# Patient Record
Sex: FEMALE | Race: WHITE | NOT HISPANIC OR LATINO
[De-identification: names, ages, dates, MRNs, and addresses within clinical notes are randomized per-mention and may not be internally consistent; named-entity substitution may affect disease eponyms.]

---

## 2021-05-18 ENCOUNTER — APPOINTMENT (OUTPATIENT)
Age: 31
End: 2021-05-18
Payer: COMMERCIAL

## 2021-05-18 PROCEDURE — 0001A: CPT

## 2021-06-08 ENCOUNTER — APPOINTMENT (OUTPATIENT)
Age: 31
End: 2021-06-08
Payer: COMMERCIAL

## 2021-06-08 PROCEDURE — 0002A: CPT

## 2024-04-08 ENCOUNTER — EMERGENCY (EMERGENCY)
Facility: HOSPITAL | Age: 34
LOS: 1 days | Discharge: HOME | End: 2024-04-08
Attending: EMERGENCY MEDICINE | Admitting: EMERGENCY MEDICINE
Payer: COMMERCIAL

## 2024-04-08 VITALS
OXYGEN SATURATION: 98 % | DIASTOLIC BLOOD PRESSURE: 75 MMHG | TEMPERATURE: 98 F | WEIGHT: 123.02 LBS | HEART RATE: 87 BPM | RESPIRATION RATE: 18 BRPM | SYSTOLIC BLOOD PRESSURE: 110 MMHG

## 2024-04-08 VITALS
OXYGEN SATURATION: 97 % | TEMPERATURE: 98 F | DIASTOLIC BLOOD PRESSURE: 59 MMHG | SYSTOLIC BLOOD PRESSURE: 93 MMHG | HEART RATE: 64 BPM | RESPIRATION RATE: 16 BRPM

## 2024-04-08 LAB
ALBUMIN SERPL ELPH-MCNC: 3.2 G/DL — LOW (ref 3.4–5)
ALP SERPL-CCNC: 40 U/L — SIGNIFICANT CHANGE UP (ref 40–120)
ALT FLD-CCNC: 23 U/L — SIGNIFICANT CHANGE UP (ref 12–42)
ANION GAP SERPL CALC-SCNC: 9 MMOL/L — SIGNIFICANT CHANGE UP (ref 9–16)
APPEARANCE UR: CLEAR — SIGNIFICANT CHANGE UP
AST SERPL-CCNC: 20 U/L — SIGNIFICANT CHANGE UP (ref 15–37)
BASOPHILS # BLD AUTO: 0.01 K/UL — SIGNIFICANT CHANGE UP (ref 0–0.2)
BASOPHILS NFR BLD AUTO: 0.2 % — SIGNIFICANT CHANGE UP (ref 0–2)
BILIRUB SERPL-MCNC: 0.6 MG/DL — SIGNIFICANT CHANGE UP (ref 0.2–1.2)
BILIRUB UR-MCNC: NEGATIVE — SIGNIFICANT CHANGE UP
BUN SERPL-MCNC: 9 MG/DL — SIGNIFICANT CHANGE UP (ref 7–23)
CALCIUM SERPL-MCNC: 8.4 MG/DL — LOW (ref 8.5–10.5)
CHLORIDE SERPL-SCNC: 105 MMOL/L — SIGNIFICANT CHANGE UP (ref 96–108)
CO2 SERPL-SCNC: 24 MMOL/L — SIGNIFICANT CHANGE UP (ref 22–31)
COLOR SPEC: SIGNIFICANT CHANGE UP
CREAT SERPL-MCNC: 0.56 MG/DL — SIGNIFICANT CHANGE UP (ref 0.5–1.3)
DIFF PNL FLD: NEGATIVE — SIGNIFICANT CHANGE UP
EGFR: 125 ML/MIN/1.73M2 — SIGNIFICANT CHANGE UP
EOSINOPHIL # BLD AUTO: 0.03 K/UL — SIGNIFICANT CHANGE UP (ref 0–0.5)
EOSINOPHIL NFR BLD AUTO: 0.6 % — SIGNIFICANT CHANGE UP (ref 0–6)
GLUCOSE SERPL-MCNC: 101 MG/DL — HIGH (ref 70–99)
GLUCOSE UR QL: 100 MG/DL
HCG UR QL: NEGATIVE — SIGNIFICANT CHANGE UP
HCT VFR BLD CALC: 42.4 % — SIGNIFICANT CHANGE UP (ref 34.5–45)
HGB BLD-MCNC: 14.8 G/DL — SIGNIFICANT CHANGE UP (ref 11.5–15.5)
IMM GRANULOCYTES NFR BLD AUTO: 0.2 % — SIGNIFICANT CHANGE UP (ref 0–0.9)
KETONES UR-MCNC: 80 MG/DL
LEUKOCYTE ESTERASE UR-ACNC: NEGATIVE — SIGNIFICANT CHANGE UP
LIDOCAIN IGE QN: 19 U/L — SIGNIFICANT CHANGE UP (ref 16–77)
LYMPHOCYTES # BLD AUTO: 0.57 K/UL — LOW (ref 1–3.3)
LYMPHOCYTES # BLD AUTO: 12.3 % — LOW (ref 13–44)
MCHC RBC-ENTMCNC: 32.4 PG — SIGNIFICANT CHANGE UP (ref 27–34)
MCHC RBC-ENTMCNC: 34.9 GM/DL — SIGNIFICANT CHANGE UP (ref 32–36)
MCV RBC AUTO: 92.8 FL — SIGNIFICANT CHANGE UP (ref 80–100)
MONOCYTES # BLD AUTO: 0.5 K/UL — SIGNIFICANT CHANGE UP (ref 0–0.9)
MONOCYTES NFR BLD AUTO: 10.8 % — SIGNIFICANT CHANGE UP (ref 2–14)
NEUTROPHILS # BLD AUTO: 3.53 K/UL — SIGNIFICANT CHANGE UP (ref 1.8–7.4)
NEUTROPHILS NFR BLD AUTO: 75.9 % — SIGNIFICANT CHANGE UP (ref 43–77)
NITRITE UR-MCNC: NEGATIVE — SIGNIFICANT CHANGE UP
NRBC # BLD: 0 /100 WBCS — SIGNIFICANT CHANGE UP (ref 0–0)
PH UR: 6.5 — SIGNIFICANT CHANGE UP (ref 5–8)
PLATELET # BLD AUTO: 259 K/UL — SIGNIFICANT CHANGE UP (ref 150–400)
POTASSIUM SERPL-MCNC: 3.9 MMOL/L — SIGNIFICANT CHANGE UP (ref 3.5–5.3)
POTASSIUM SERPL-SCNC: 3.9 MMOL/L — SIGNIFICANT CHANGE UP (ref 3.5–5.3)
PROT SERPL-MCNC: 7.2 G/DL — SIGNIFICANT CHANGE UP (ref 6.4–8.2)
PROT UR-MCNC: 30 MG/DL
RBC # BLD: 4.57 M/UL — SIGNIFICANT CHANGE UP (ref 3.8–5.2)
RBC # FLD: 12.3 % — SIGNIFICANT CHANGE UP (ref 10.3–14.5)
SODIUM SERPL-SCNC: 138 MMOL/L — SIGNIFICANT CHANGE UP (ref 132–145)
SP GR SPEC: 1.03 — HIGH (ref 1–1.03)
UROBILINOGEN FLD QL: 4 MG/DL (ref 0.2–1)
WBC # BLD: 4.65 K/UL — SIGNIFICANT CHANGE UP (ref 3.8–10.5)
WBC # FLD AUTO: 4.65 K/UL — SIGNIFICANT CHANGE UP (ref 3.8–10.5)

## 2024-04-08 PROCEDURE — 76856 US EXAM PELVIC COMPLETE: CPT | Mod: 26

## 2024-04-08 PROCEDURE — 99285 EMERGENCY DEPT VISIT HI MDM: CPT

## 2024-04-08 PROCEDURE — 74177 CT ABD & PELVIS W/CONTRAST: CPT | Mod: 26,MC

## 2024-04-08 PROCEDURE — 76830 TRANSVAGINAL US NON-OB: CPT | Mod: 26

## 2024-04-08 RX ORDER — SODIUM CHLORIDE 9 MG/ML
1000 INJECTION INTRAMUSCULAR; INTRAVENOUS; SUBCUTANEOUS ONCE
Refills: 0 | Status: COMPLETED | OUTPATIENT
Start: 2024-04-08 | End: 2024-04-08

## 2024-04-08 RX ORDER — ONDANSETRON 8 MG/1
1 TABLET, FILM COATED ORAL
Qty: 15 | Refills: 0
Start: 2024-04-08 | End: 2024-04-12

## 2024-04-08 RX ORDER — ONDANSETRON 8 MG/1
8 TABLET, FILM COATED ORAL ONCE
Refills: 0 | Status: COMPLETED | OUTPATIENT
Start: 2024-04-08 | End: 2024-04-08

## 2024-04-08 RX ORDER — FAMOTIDINE 10 MG/ML
1 INJECTION INTRAVENOUS
Qty: 14 | Refills: 0
Start: 2024-04-08 | End: 2024-04-14

## 2024-04-08 RX ORDER — IBUPROFEN 200 MG
1 TABLET ORAL
Qty: 28 | Refills: 0
Start: 2024-04-08 | End: 2024-04-14

## 2024-04-08 RX ORDER — KETOROLAC TROMETHAMINE 30 MG/ML
15 SYRINGE (ML) INJECTION ONCE
Refills: 0 | Status: DISCONTINUED | OUTPATIENT
Start: 2024-04-08 | End: 2024-04-08

## 2024-04-08 RX ADMIN — Medication 15 MILLIGRAM(S): at 18:51

## 2024-04-08 RX ADMIN — ONDANSETRON 8 MILLIGRAM(S): 8 TABLET, FILM COATED ORAL at 18:52

## 2024-04-08 RX ADMIN — SODIUM CHLORIDE 1000 MILLILITER(S): 9 INJECTION INTRAMUSCULAR; INTRAVENOUS; SUBCUTANEOUS at 18:53

## 2024-04-08 RX ADMIN — SODIUM CHLORIDE 1000 MILLILITER(S): 9 INJECTION INTRAMUSCULAR; INTRAVENOUS; SUBCUTANEOUS at 19:51

## 2024-04-08 NOTE — ED ADULT TRIAGE NOTE - NS ED NURSE BANDS TYPE
Name band; Consent 1/Introductory Paragraph: The rationale for Mohs was explained to the patient and consent was obtained. The risks, benefits and alternatives to therapy were discussed in detail. Specifically, the risks of infection, scarring, bleeding, prolonged wound healing, incomplete removal, allergy to anesthesia, nerve injury and recurrence were addressed. Prior to the procedure, the treatment site was clearly identified and confirmed by the patient. All components of Universal Protocol/PAUSE Rule completed.

## 2024-04-08 NOTE — ED PROVIDER NOTE - CLINICAL SUMMARY MEDICAL DECISION MAKING FREE TEXT BOX
Soft NTND abd on re-exam, blood tests unremarkable w/no acute findings on CT, + known ovarian cysts with good blood flow on pelvic u/s. Sx well controlled in ED, tolerating PO. + dehydration on UA, treated with 2L NS IVF bolus. Will d/c home with continued supportive care for sx. Given return precautions. Already has elective surgery scheduled for next month to remove dermoid cyst. Will f/u with GYN.

## 2024-04-08 NOTE — ED PROVIDER NOTE - PATIENT PORTAL LINK FT
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     If you are having any concerning symptoms or wish to speak to a provider before your next infusion visit, please call your care coordinator or triage to notify them so we can adequately serve you.       After Hours: [phone]    If after hours, weekends, or holidays, call main hospital  and ask for Oncology doctor on call.           September 2017 Sunday Monday Tuesday Wednesday Thursday Friday Saturday                            1     2       3     4     5     [facility] LAB DRAW    8:15 AM   (15 min.)    [facility] LAB DRAW   [facility] Lab Draw     CT CHEST/ABDOMEN/PELVIS W    8:45 AM   (20 min.)   UCCT2   [facility] CT     [facility] RETURN   11:15 AM   (30 min.)   Kadi Dee MD   [facility] ONC INFUSION 240    1:00 PM   (240 min.)    ONCOLOGY INFUSION   [facility] 6     7     8     9       10     11     12     13     14     15     16       17     18     19     [facility] LAB DRAW    8:00 AM   (15 min.)    [facility] LAB DRAW   [facility] Lab Draw     P RETURN    8:25 AM   (50 min.)   Loraine Sutherland PA-C   [facility] NEW WITH ROOM    9:45 AM   (60 min.)   Dao Lacy,  Amy   [facility]     [facility] ONC INFUSION 240   11:00 AM   (240 min.)    ONCOLOGY INFUSION   [facility] 20     21     22     23       24     25     26     27     28     29     30                October 2017 Sunday Monday Tuesday Wednesday Thursday Friday Saturday   1     2     [facility] DRAW   11:15 AM   (15 min.)   [facility] DRAW   [facility] Lab Draw     UMP RETURN   11:45 AM   (30 min.)   Kadi Dee MD   [facility]     [facility] ONC INFUSION 240   12:30 PM   (240 min.)   UC ONCOLOGY INFUSION   [facility] 3     4     5     6     7       8     9     10     11     12     13     14       15     16     17     [facility] DRAW   10:15 AM   (15 min.)   [facility] LAB DRAW   [facility] Lab Draw     UMP RETURN   10:45 AM   (50 min.)   Katalina Ramirez PA-C   [facility] 240   11:30 AM   (240 min.)    ONCOLOGY INFUSION   [facility] 18     19     20     21       22     23     24     25     26     27     28       29     30     [facility] LAB DRAW   10:15 AM   (15 min.)   [facility]   [facility]     [facility] RETURN   12:45 PM   (30 min.)   Kadi Dee MD   [facility] 240    1:30 PM   (240 min.)    ONCOLOGY INFUSION   [facility] 31                                     Recent Results (from the past 24 hour(s))   *CBC with platelets differential    Collection Time: 09/05/17  8:17 AM   Result Value Ref Range    WBC 4.1 4.0 - 11.0 10e9/L    RBC Count 4.24 (L) 4.4 - 5.9 10e12/L    Hemoglobin 13.9 13.3 - 17.7 g/dL    Hematocrit 40.6 40.0 - 53.0 %    MCV 96 78 - 100 fl    MCH 32.8 26.5 - 33.0 pg    MCHC 34.2 31.5 - 36.5 g/dL    RDW 13.5 10.0 - 15.0 %    Platelet Count 122 (L) 150 - 450 10e9/L    Diff Method Automated Method     % Neutrophils 59.6 %    % Lymphocytes  29.3 %    % Monocytes 9.6 %    % Eosinophils 0.5 %    % Basophils 0.5 %    % Immature Granulocytes 0.5 %    Nucleated RBCs 0 0 /100    Absolute Neutrophil 2.4 1.6 - 8.3 10e9/L    Absolute Lymphocytes 1.2 0.8 - 5.3 10e9/L    Absolute Monocytes 0.4 0.0 - 1.3 10e9/L    Absolute Eosinophils 0.0 0.0 - 0.7 10e9/L    Absolute Basophils 0.0 0.0 - 0.2 10e9/L    Abs Immature Granulocytes 0.0 0 - 0.4 10e9/L    Absolute Nucleated RBC 0.0    Comprehensive metabolic panel    Collection Time: 09/05/17  8:17 AM   Result Value Ref Range    Sodium 140 133 - 144 mmol/L    Potassium 3.9 3.4 - 5.3 mmol/L    Chloride 109 94 - 109 mmol/L    Carbon Dioxide 26 20 - 32 mmol/L    Anion Gap 6 3 - 14 mmol/L    Glucose 102 (H) 70 - 99 mg/dL    Urea Nitrogen 12 7 - 30 mg/dL    Creatinine 0.76 0.66 - 1.25 mg/dL    GFR Estimate >90 >60 mL/min/1.7m2    GFR Estimate If Black >90 >60 mL/min/1.7m2    Calcium 8.6 8.5 - 10.1 mg/dL    Bilirubin Total 0.6 0.2 - 1.3 mg/dL    Albumin 3.1 (L) 3.4 - 5.0 g/dL    Protein Total 7.5 6.8 - 8.8 g/dL    Alkaline Phosphatase 93 40 - 150 U/L    ALT 26 0 - 70 U/L    AST 27 0 - 45 U/L               Follow-ups after your visit        Your next 10 appointments already scheduled     Sep 19, 2017  8:00 AM CDT   [facility] Lab Draw with  RawFlow LAB DRAW   [facility] Lab Draw ([facility])    [address] [address] [phone]            Sep 19, 2017  8:40 AM CDT   (Arrive by 8:25 AM)   Return Visit with Loraine Sutherland PA-C   [facility] ([facility])    [address] [address] [phone]            Sep 19, 2017 10:00 AM CDT   (Arrive by 9:45 AM)   NEW WITH ROOM with Dao Lacy PsyD,  2 116 CONSULT [facility] ([facility])    [address] [place] [address] [phone]            Sep 19,  2017 11:00 AM CDT   Infusion 240 with UC ONCOLOGY INFUSION, UC 31 ATC   [facility] ([facility])    [address] [address] [phone]            Oct 02, 2017 11:15 AM CDT   [facility] Lab Draw with UC MASONIC LAB DRAW   [facility] Lab Draw ([facility])    [address] [place] [id number] [phone]            Oct 02, 2017 12:00 PM CDT   (Arrive by 11:45 AM)   Return Visit with Kadi Dee MD   [facility] ([facility])    [address] [place] [id number] [phone]            Oct 02, 2017 12:30 PM CDT   Infusion 240 with UC ONCOLOGY INFUSION, UC 21 ATC   [facility] ([facility])    [address] [place] [id number] [phone]              Who to contact     If you have questions or need follow up information about today's clinic visit or your schedule please contact [facility] directly at [phone].  Normal or non-critical lab and imaging results will be communicated to you by [employer], letter or phone within 4 business days after the clinic has received the results. If you do not hear from us within 7 days, please contact the clinic through [employer] or phone. If you have a critical or abnormal lab result, we will notify you by phone as soon as possible.  Submit refill requests through [employer] or call your pharmacy and they will forward the refill request to us. Please allow 3 business days for your refill to be completed.          Additional Information About Your Visit        [employer] Information     [employer] gives you secure access to your electronic health record. If you see a primary care provider, you can also send messages to your care team and make appointments. If you have questions, please call  your primary care clinic.  If you do not have a primary care provider, please call [phone] and they will assist you.        Care EveryWhere ID     This is your Care EveryWhere ID. This could be used by other organizations to access your [facility] medical records  [id number]         Blood Pressure from Last 3 Encounters:   09/05/17 107/72   08/22/17 122/77   08/21/17 130/86    Weight from Last 3 Encounters:   09/05/17 (!) 166.1 kg (366 lb 1.6 oz)   08/22/17 (!) 168 kg (370 lb 4.8 oz)   08/21/17 (!) 166.9 kg (367 lb 14.4 oz)                 Today's Medication Changes          These changes are accurate as of: 9/5/17  2:24 PM.  If you have any questions, ask your nurse or doctor.               Start taking these medicines.        Dose/Directions    dexamethasone 4 MG tablet   Commonly known as:  DECADRON   Used for:  Cancer of distal third of esophagus (H)        Take two tablets daily on days 2,3 and then 1 tablet daily days 4,5   Quantity:  6 tablet   Refills:  1            Where to get your medicines      These medications were sent to [facility] [address] [place] [address] [address] [place] [id number]    Hours:  TRANSPLANT PHONE NUMBER [phone] Phone:  [phone]     dexamethasone 4 MG tablet                Primary Care Provider    Unknown Primary MD Sydney       No address on file        Equal Access to Services     OLLIE SHARPE AH: Hadii aad ku hadasho Sojose antonioali, waaxda luqadaha, qaybta kaalmada adeegyada, geraldo cardenas. So [facility] [phone].    ATENCIÓN: Si habla español, tiene a alamo disposición servicios gratuitos de asistencia lingüística. Llame al [phone].    We comply with applicable federal civil rights laws and [place] laws. We do not discriminate on the basis of race, color, national origin, age, disability sex, sexual orientation or gender identity.            Thank you!     Thank you for choosing [facility]  for your care. Our goal is always to provide you with excellent care. Hearing back from our patients is one way we can continue to improve our services. Please take a few minutes to complete the written survey that you may receive in the mail after your visit with us. Thank you!             Your Updated Medication List - Protect others around you: Learn how to safely use, store and throw away your medicines at www.disposemymeds.org.          This list is accurate as of: 9/5/17  2:24 PM.  Always use your most recent med list.                   Brand Name Dispense Instructions for use Diagnosis    dexamethasone 4 MG tablet    DECADRON    6 tablet    Take two tablets daily on days 2,3 and then 1 tablet daily days 4,5    Cancer of distal third of esophagus (H)       fish oil-omega-3 fatty acids 1000 MG capsule      Take 2 g by mouth daily        IBUPROFEN PO      Take 800 mg by mouth every 8 hours as needed for moderate pain        latanoprost 0.005 % ophthalmic solution    XALATAN     Place into both eyes At Bedtime    Cancer of distal third of esophagus (H)       lidocaine-prilocaine cream    EMLA    30 g    Apply topically as needed for moderate pain    Cancer of distal third of esophagus (H)       LORazepam 0.5 MG tablet    ATIVAN    60 tablet    Take 1 tablet (0.5 mg) by mouth every 6 hours as needed for anxiety    Cancer of distal third of esophagus (H), 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), Other insomnia       Multi-vitamin Tabs tablet      Take 1 tablet by mouth daily        ondansetron 8 MG tablet    ZOFRAN    30 tablet    Take 1 tablet (8 mg) by mouth every 8 hours as needed (Nausea/Vomiting)    Cancer of distal third of esophagus (H)       prochlorperazine 10 MG tablet    COMPAZINE    30 tablet    Take 1 tablet (10 mg) by mouth every 6 hours as needed (Nausea/Vomiting)    Cancer of distal third of esophagus (H)       rivaroxaban ANTICOAGULANT 20 MG Tabs tablet    XARELTO     90 tablet    Take 1 tablet (20 mg) by mouth daily (with dinner)    Persistent atrial fibrillation (H), Other pulmonary embolism without acute cor pulmonale (H), Cancer of distal third of esophagus (H)       timolol 0.5 % ophthalmic solution    TIMOPTIC     Place into both eyes daily    Cancer of distal third of esophagus (H)       * zolpidem 12.5 MG CR tablet    AMBIEN CR    30 tablet    Take 1 tablet (12.5 mg) by mouth nightly as needed for sleep    Insomnia, unspecified type       * zolpidem 6.25 MG CR tablet    AMBIEN CR    60 tablet    Take 1 tablet (6.25 mg) by mouth nightly as needed for sleep    Cancer of distal third of esophagus (H)       * Notice:  This list has 2 medication(s) that are the same as other medications prescribed for you. Read the directions carefully, and ask your doctor or other care provider to review them with you.       You can access the FollowMyHealth Patient Portal offered by [facility] by registering at the following website: [email or web address]. By joining [employer]’s FollowMyHealth portal, you will also be able to view your health information using other applications (apps) compatible with our system.

## 2024-04-08 NOTE — ED PROVIDER NOTE - OBJECTIVE STATEMENT
33-year-old female with a known history of a right ovarian dermoid cyst with a 5.5 cm diameter presents with 3 days of right lower quadrant abdominal pain with associated nausea and vomiting.  Reports nonbilious nonbloody emesis and difficulty keeping down solids and liquids throughout the day today and yesterday.  Patient had 1 hard bowel movement with some improvement of pain.  Reports passing gas.  No urinary symptoms.  No vaginal bleeding or discharge.  Patient states she completed her menstrual cycle 4 to 5 days ago.  Menstrual cycle was on time.  She is currently on OCPs and does not believe that she is pregnant.  No travel or sick contacts.  No history of abdominal surgeries.

## 2024-04-08 NOTE — ED PROVIDER NOTE - NSFOLLOWUPINSTRUCTIONS_ED_ALL_ED_FT
Nausea and Vomiting, Adult  Nausea is the feeling that you have an upset stomach or that you are about to vomit. As nausea gets worse, it can lead to vomiting. Vomiting is when stomach contents forcefully come out of your mouth as a result of nausea. Vomiting can make you feel weak and cause you to become dehydrated.    Dehydration can make you feel tired and thirsty, cause you to have a dry mouth, and decrease how often you urinate. Older adults and people with other diseases or a weak disease-fighting system (immune system) are at higher risk for dehydration. It is important to treat your nausea and vomiting as told by your health care provider.    Follow these instructions at home:  Watch your symptoms for any changes. Tell your health care provider about them.    Eating and drinking    A bottle of clear fruit juice and glass of water.  A sign showing that a person should not drink alcohol.  Take an oral rehydration solution (ORS). This is a drink that is sold at pharmacies and retail stores.  Drink clear fluids slowly and in small amounts as you are able. Clear fluids include water, ice chips, low-calorie sports drinks, and fruit juice that has water added (diluted fruit juice).  Eat bland, easy-to-digest foods in small amounts as you are able. These foods include bananas, applesauce, rice, lean meats, toast, and crackers.  Avoid fluids that contain a lot of sugar or caffeine, such as energy drinks, sports drinks, and soda.  Avoid alcohol.  Avoid spicy or fatty foods.  General instructions    Take over-the-counter and prescription medicines only as told by your health care provider.  Drink enough fluid to keep your urine pale yellow.  Wash your hands often using soap and water for at least 20 seconds. If soap and water are not available, use hand .  Make sure that everyone in your household washes their hands well and often.  Rest at home while you recover.  Watch your condition for any changes.  Take slow and deep breaths when you feel nauseous.  Keep all follow-up visits. This is important.  Contact a health care provider if:  Your symptoms get worse.  You have new symptoms.  You have a fever.  You cannot drink fluids without vomiting.  Your nausea does not go away after 2 days.  You feel light-headed or dizzy.  You have a headache.  You have muscle cramps.  You have a rash.  You have pain while urinating.  Get help right away if:  You have pain in your chest, neck, arm, or jaw.  You feel extremely weak or you faint.  You have persistent vomiting.  You have vomit that is bright red or looks like black coffee grounds.  You have bloody or black stools (feces) or stools that look like tar.  You have a severe headache, a stiff neck, or both.  You have severe pain, cramping, or bloating in your abdomen.  You have difficulty breathing, or you are breathing very quickly.  Your heart is beating very quickly.  Your skin feels cold and clammy.  You feel confused.  You have signs of dehydration, such as:  Dark urine, very little urine, or no urine.  Cracked lips.  Dry mouth.  Sunken eyes.  Sleepiness.  Weakness.  These symptoms may be an emergency. Get help right away. Call 911.  Do not wait to see if the symptoms will go away.  Do not drive yourself to the hospital.  Summary  Nausea is the feeling that you have an upset stomach or that you are about to vomit. As nausea gets worse, it can lead to vomiting. Vomiting can make you feel weak and cause you to become dehydrated.  Follow instructions from your health care provider about eating and drinking to prevent dehydration.  Take over-the-counter and prescription medicines only as told by your health care provider.  Contact your health care provider if your symptoms get worse, or you have new symptoms.  Keep all follow-up visits. This is important.  This information is not intended to replace advice given to you by your health care provider. Make sure you discuss any questions you have with your health care provider.

## 2024-04-08 NOTE — ED ADULT NURSE NOTE - OBJECTIVE STATEMENT
Patient with h/o ovarian cyst presents with complaints of constant dull right lower quadrant pain since Saturday, nausea and vomiting. Denies fever, chills, diarrhea, dizziness, CP, SOB.

## 2024-04-08 NOTE — ED PROVIDER NOTE - PHYSICAL EXAMINATION
VITAL SIGNS: I have reviewed nursing notes and confirm.  CONSTITUTIONAL: Well-developed; well-nourished; in no acute distress.  SKIN: Skin exam is warm and dry, no acute rash.  HEAD: Normocephalic; atraumatic.  EYES: PERRL, EOM intact; conjunctiva and sclera clear.  ENT: No nasal discharge; airway clear.  NECK: Supple; non tender.  CARD: Regular rate and rhythm.  RESP: Unlabored.  ABD: soft; non-distended; non-tender  EXT: Normal ROM  NEURO: Alert, oriented. Grossly unremarkable.  PSYCH: Cooperative, appropriate.

## 2024-04-10 DIAGNOSIS — R10.31 RIGHT LOWER QUADRANT PAIN: ICD-10-CM

## 2024-04-10 DIAGNOSIS — E86.0 DEHYDRATION: ICD-10-CM

## 2024-04-10 DIAGNOSIS — R11.2 NAUSEA WITH VOMITING, UNSPECIFIED: ICD-10-CM
